# Patient Record
Sex: FEMALE | Race: WHITE | Employment: FULL TIME | URBAN - METROPOLITAN AREA
[De-identification: names, ages, dates, MRNs, and addresses within clinical notes are randomized per-mention and may not be internally consistent; named-entity substitution may affect disease eponyms.]

---

## 2019-05-28 ENCOUNTER — HOSPITAL ENCOUNTER (OUTPATIENT)
Dept: MAMMOGRAPHY | Age: 29
Discharge: HOME | End: 2019-05-28
Attending: NURSE PRACTITIONER
Payer: COMMERCIAL

## 2019-05-28 DIAGNOSIS — N63.0 BREAST MASS IN FEMALE: ICD-10-CM

## 2019-05-28 PROCEDURE — 76642 ULTRASOUND BREAST LIMITED: CPT

## 2019-05-28 PROCEDURE — 77066 DX MAMMO INCL CAD BI: CPT

## 2019-05-28 NOTE — PROGRESS NOTES
Normal mammogram and ultrasound. Right breast with small cyst ar areola. No further treatment needed unless it increases in size or becomes painful. Spoke with patient.   Ronal Hunt